# Patient Record
Sex: MALE | Race: WHITE | URBAN - METROPOLITAN AREA
[De-identification: names, ages, dates, MRNs, and addresses within clinical notes are randomized per-mention and may not be internally consistent; named-entity substitution may affect disease eponyms.]

---

## 2022-03-09 ENCOUNTER — OFFICE VISIT (OUTPATIENT)
Dept: URBAN - METROPOLITAN AREA CLINIC 6 | Facility: CLINIC | Age: 77
End: 2022-03-09
Payer: MEDICARE

## 2022-03-09 DIAGNOSIS — H04.123 TEAR FILM INSUFFICIENCY OF BILATERAL LACRIMAL GLANDS: ICD-10-CM

## 2022-03-09 DIAGNOSIS — E11.9 TYPE 2 DIABETES MELLITUS WITHOUT COMPLICATIONS: Primary | ICD-10-CM

## 2022-03-09 DIAGNOSIS — H52.4 PRESBYOPIA: ICD-10-CM

## 2022-03-09 DIAGNOSIS — H43.811 VITREOUS DEGENERATION, RIGHT EYE: ICD-10-CM

## 2022-03-09 PROCEDURE — 92014 COMPRE OPH EXAM EST PT 1/>: CPT | Performed by: OPTOMETRIST

## 2022-03-09 ASSESSMENT — VISUAL ACUITY: OD: 20/30

## 2022-03-09 ASSESSMENT — INTRAOCULAR PRESSURE
OD: 20
OS: 21

## 2022-03-09 NOTE — IMPRESSION/PLAN
Impression: Type 2 diabetes mellitus without complications: I69.5. Plan: DM Type 2 Without complications- Discussed the pathophysiology of diabetes and its effect on the eye. Stressed the importance of strong glucose control. Advised of importance of scheduled dilated examinations, and to contact us immediately for any problems or concerns.

## 2022-03-09 NOTE — IMPRESSION/PLAN
Impression: Vitreous degeneration, right eye: H43.811. Plan: reviewed the signs and symptoms of possible retinal detachment (flashes, floaters, change in peripheral vision, etc). Advised to contact us immediately for any of these or other new symptoms.

## 2022-03-09 NOTE — IMPRESSION/PLAN
Impression: Presbyopia: H52.4. Plan: Blurry vision / refractive error: Prescription given for glasses.  Dilated exam performed and was unremarkable